# Patient Record
Sex: FEMALE | Race: OTHER | NOT HISPANIC OR LATINO | URBAN - METROPOLITAN AREA
[De-identification: names, ages, dates, MRNs, and addresses within clinical notes are randomized per-mention and may not be internally consistent; named-entity substitution may affect disease eponyms.]

---

## 2022-07-08 ENCOUNTER — OUTPATIENT (OUTPATIENT)
Dept: OUTPATIENT SERVICES | Facility: HOSPITAL | Age: 52
LOS: 1 days | End: 2022-07-08
Payer: COMMERCIAL

## 2022-07-08 VITALS
RESPIRATION RATE: 14 BRPM | WEIGHT: 240.08 LBS | HEART RATE: 78 BPM | SYSTOLIC BLOOD PRESSURE: 132 MMHG | DIASTOLIC BLOOD PRESSURE: 86 MMHG | OXYGEN SATURATION: 98 % | TEMPERATURE: 98 F | HEIGHT: 65 IN

## 2022-07-08 DIAGNOSIS — Z01.818 ENCOUNTER FOR OTHER PREPROCEDURAL EXAMINATION: ICD-10-CM

## 2022-07-08 DIAGNOSIS — Z98.890 OTHER SPECIFIED POSTPROCEDURAL STATES: Chronic | ICD-10-CM

## 2022-07-08 DIAGNOSIS — N84.0 POLYP OF CORPUS UTERI: ICD-10-CM

## 2022-07-08 DIAGNOSIS — Z11.52 ENCOUNTER FOR SCREENING FOR COVID-19: ICD-10-CM

## 2022-07-08 LAB
ANION GAP SERPL CALC-SCNC: 12 MMOL/L — SIGNIFICANT CHANGE UP (ref 5–17)
BLD GP AB SCN SERPL QL: NEGATIVE — SIGNIFICANT CHANGE UP
BUN SERPL-MCNC: 14 MG/DL — SIGNIFICANT CHANGE UP (ref 7–23)
CALCIUM SERPL-MCNC: 9.7 MG/DL — SIGNIFICANT CHANGE UP (ref 8.4–10.5)
CHLORIDE SERPL-SCNC: 100 MMOL/L — SIGNIFICANT CHANGE UP (ref 96–108)
CO2 SERPL-SCNC: 27 MMOL/L — SIGNIFICANT CHANGE UP (ref 22–31)
CREAT SERPL-MCNC: 1 MG/DL — SIGNIFICANT CHANGE UP (ref 0.5–1.3)
EGFR: 68 ML/MIN/1.73M2 — SIGNIFICANT CHANGE UP
GLUCOSE SERPL-MCNC: 80 MG/DL — SIGNIFICANT CHANGE UP (ref 70–99)
HCT VFR BLD CALC: 40.1 % — SIGNIFICANT CHANGE UP (ref 34.5–45)
HGB BLD-MCNC: 13.2 G/DL — SIGNIFICANT CHANGE UP (ref 11.5–15.5)
MCHC RBC-ENTMCNC: 26.9 PG — LOW (ref 27–34)
MCHC RBC-ENTMCNC: 32.9 GM/DL — SIGNIFICANT CHANGE UP (ref 32–36)
MCV RBC AUTO: 81.8 FL — SIGNIFICANT CHANGE UP (ref 80–100)
NRBC # BLD: 0 /100 WBCS — SIGNIFICANT CHANGE UP (ref 0–0)
PLATELET # BLD AUTO: 282 K/UL — SIGNIFICANT CHANGE UP (ref 150–400)
POTASSIUM SERPL-MCNC: 3.4 MMOL/L — LOW (ref 3.5–5.3)
POTASSIUM SERPL-SCNC: 3.4 MMOL/L — LOW (ref 3.5–5.3)
RBC # BLD: 4.9 M/UL — SIGNIFICANT CHANGE UP (ref 3.8–5.2)
RBC # FLD: 13.2 % — SIGNIFICANT CHANGE UP (ref 10.3–14.5)
RH IG SCN BLD-IMP: POSITIVE — SIGNIFICANT CHANGE UP
SODIUM SERPL-SCNC: 139 MMOL/L — SIGNIFICANT CHANGE UP (ref 135–145)
WBC # BLD: 4.52 K/UL — SIGNIFICANT CHANGE UP (ref 3.8–10.5)
WBC # FLD AUTO: 4.52 K/UL — SIGNIFICANT CHANGE UP (ref 3.8–10.5)

## 2022-07-08 PROCEDURE — 86850 RBC ANTIBODY SCREEN: CPT

## 2022-07-08 PROCEDURE — 85027 COMPLETE CBC AUTOMATED: CPT

## 2022-07-08 PROCEDURE — C9803: CPT

## 2022-07-08 PROCEDURE — 86900 BLOOD TYPING SEROLOGIC ABO: CPT

## 2022-07-08 PROCEDURE — 86901 BLOOD TYPING SEROLOGIC RH(D): CPT

## 2022-07-08 PROCEDURE — U0003: CPT

## 2022-07-08 PROCEDURE — 36415 COLL VENOUS BLD VENIPUNCTURE: CPT

## 2022-07-08 PROCEDURE — G0463: CPT

## 2022-07-08 PROCEDURE — U0005: CPT

## 2022-07-08 PROCEDURE — 80048 BASIC METABOLIC PNL TOTAL CA: CPT

## 2022-07-08 RX ORDER — SODIUM CHLORIDE 9 MG/ML
1000 INJECTION, SOLUTION INTRAVENOUS
Refills: 0 | Status: DISCONTINUED | OUTPATIENT
Start: 2022-07-12 | End: 2022-07-26

## 2022-07-08 RX ORDER — SODIUM CHLORIDE 9 MG/ML
3 INJECTION INTRAMUSCULAR; INTRAVENOUS; SUBCUTANEOUS EVERY 8 HOURS
Refills: 0 | Status: DISCONTINUED | OUTPATIENT
Start: 2022-07-12 | End: 2022-07-26

## 2022-07-08 NOTE — H&P PST ADULT - NS PRO REFERRAL CMGT
RN triage ----   Call from pt   Pt states for the past 2 1/2 hrs -- having R upper abd pain -- under ribs --   Sharp pain for a second-- then fades -- and returns Q 35 seconds   No chest pain -- no diff breathing   No nausea or vomiting -- no diarrhea or constipation -- no blood   No fever   Pt thinks maybe gall bladder   Pt states she has had this type of pain in the past -- but not this intense and not lasting this long   Per protocol = should go to ED   Pt requesting PCP advice --   Can she be seen in the clinic ?  Can she go to Urgent care instead of ED or does she need to go to ED   Please advise   Yolis Chaudhry RN [facility] RN triage      Reason for Disposition    Pain lasting > 10 minutes and over 50 years old    Protocols used: ABDOMINAL PAIN - UPPER-A-OH       None

## 2022-07-08 NOTE — H&P PST ADULT - NSICDXPASTSURGICALHX_GEN_ALL_CORE_FT
PAST SURGICAL HISTORY:  H/O plastic surgery face as teenager    History of D&C x's 2 d/t polyps/cysts

## 2022-07-08 NOTE — H&P PST ADULT - NEGATIVE ENMT SYMPTOMS
no hearing difficulty/no ear pain/no tinnitus/no vertigo/no sinus symptoms/no nasal congestion/no nasal discharge/no abnormal taste sensation/no throat pain/no dysphagia

## 2022-07-08 NOTE — H&P PST ADULT - NSANTHOSAYNRD_GEN_A_CORE
No. BLAINE screening performed.  STOP BANG Legend: 0-2 = LOW Risk; 3-4 = INTERMEDIATE Risk; 5-8 = HIGH Risk

## 2022-07-08 NOTE — H&P PST ADULT - PROBLEM SELECTOR PLAN 1
- scheduled for a D&C; operative hysteroscopy; polypectomy on 7/12/22 with Soila Silva  -CBC, BMP, T&S today in [facility]  -COVID swab scheduled for 7/12/22 at [facility]  -Preop instructions provided; Patient stated understanding using teach back method   -Adequate time provided for questions and answers   -Patient advised to stop MVIT today  -Patient advised to take misoprostol and amlodipine am DOS  -POCT urine pregnancy and ABO upon arrival to unit DOS   -LR ordered per protocol

## 2022-07-08 NOTE — H&P PST ADULT - NSICDXPASTMEDICALHX_GEN_ALL_CORE_FT
PAST MEDICAL HISTORY:  Hypertension     Musculoskeletal back pain      PAST MEDICAL HISTORY:  History of goiter (seen by endocrine 3-4 yrs ago/followed by pcp with labs- last 2 months ago/no medications/no difficulty swallowing)    Hypertension     Musculoskeletal back pain

## 2022-07-08 NOTE — H&P PST ADULT - HISTORY OF PRESENT ILLNESS
50 y/o female  50 y/o female with PMH hypertension presents today for presurgical testing.  She has been having intermittent spotting and pelvic cramping.  She is scheduled for a D&C; operative hysteroscopy; polypectomy on 7/12/22 with Soila Silva.  Denies fever, chills, malaise, fatigue, n/v, diarrhea, abdominal pain, sore throat, headaches, nasal congestion, rhinnorhea, ear pain, cough, SOB, chest pain, palpitations, and change in taste/smell.     COVID swab scheduled today (7/8/22) at [facility] 52 y/o female with PMH hypertension, Goiter (seen by endocrine 3-4 yrs ago/followed by pcp with labs- last 2 months ago/no medications/no difficulty swallowing) presents today for presurgical testing.  She has been having intermittent spotting and pelvic cramping.  She is scheduled for a D&C; operative hysteroscopy; polypectomy on 7/12/22 with Soila Silva.  Denies fever, chills, malaise, fatigue, n/v, diarrhea, abdominal pain, sore throat, headaches, nasal congestion, rhinnorhea, ear pain, cough, SOB, chest pain, palpitations, and change in taste/smell.     Chest xray on chart revealed goiter- called patient at 715pm to confirm the above; patient will fax latest thyroid test results and will attempt to get last office note from pcp.  COVID swab scheduled today (7/8/22) at [facility]

## 2022-07-09 LAB — SARS-COV-2 RNA SPEC QL NAA+PROBE: SIGNIFICANT CHANGE UP

## 2022-07-11 ENCOUNTER — TRANSCRIPTION ENCOUNTER (OUTPATIENT)
Age: 52
End: 2022-07-11

## 2022-07-12 ENCOUNTER — TRANSCRIPTION ENCOUNTER (OUTPATIENT)
Age: 52
End: 2022-07-12

## 2022-07-12 ENCOUNTER — RESULT REVIEW (OUTPATIENT)
Age: 52
End: 2022-07-12

## 2022-07-12 ENCOUNTER — OUTPATIENT (OUTPATIENT)
Dept: OUTPATIENT SERVICES | Facility: HOSPITAL | Age: 52
LOS: 1 days | End: 2022-07-12
Payer: COMMERCIAL

## 2022-07-12 VITALS
OXYGEN SATURATION: 98 % | TEMPERATURE: 97 F | SYSTOLIC BLOOD PRESSURE: 139 MMHG | HEART RATE: 96 BPM | WEIGHT: 240.08 LBS | RESPIRATION RATE: 16 BRPM | HEIGHT: 65 IN | DIASTOLIC BLOOD PRESSURE: 85 MMHG

## 2022-07-12 VITALS
HEART RATE: 87 BPM | SYSTOLIC BLOOD PRESSURE: 135 MMHG | OXYGEN SATURATION: 98 % | DIASTOLIC BLOOD PRESSURE: 71 MMHG | RESPIRATION RATE: 18 BRPM

## 2022-07-12 DIAGNOSIS — N84.0 POLYP OF CORPUS UTERI: ICD-10-CM

## 2022-07-12 DIAGNOSIS — Z98.890 OTHER SPECIFIED POSTPROCEDURAL STATES: Chronic | ICD-10-CM

## 2022-07-12 DIAGNOSIS — Z01.818 ENCOUNTER FOR OTHER PREPROCEDURAL EXAMINATION: ICD-10-CM

## 2022-07-12 LAB — RH IG SCN BLD-IMP: POSITIVE — SIGNIFICANT CHANGE UP

## 2022-07-12 PROCEDURE — 88305 TISSUE EXAM BY PATHOLOGIST: CPT

## 2022-07-12 PROCEDURE — 88305 TISSUE EXAM BY PATHOLOGIST: CPT | Mod: 26

## 2022-07-12 PROCEDURE — 58558 HYSTEROSCOPY BIOPSY: CPT

## 2022-07-12 RX ORDER — AMLODIPINE BESYLATE 2.5 MG/1
1 TABLET ORAL
Qty: 0 | Refills: 0 | DISCHARGE

## 2022-07-12 RX ORDER — MEDROXYPROGESTERONE ACETATE 150 MG/ML
1 INJECTION, SUSPENSION, EXTENDED RELEASE INTRAMUSCULAR
Qty: 0 | Refills: 0 | DISCHARGE

## 2022-07-12 RX ORDER — LIDOCAINE HCL 20 MG/ML
0.2 VIAL (ML) INJECTION ONCE
Refills: 0 | Status: COMPLETED | OUTPATIENT
Start: 2022-07-12 | End: 2022-07-12

## 2022-07-12 RX ORDER — MISOPROSTOL 200 UG/1
2 TABLET ORAL
Qty: 0 | Refills: 0 | DISCHARGE

## 2022-07-12 RX ADMIN — SODIUM CHLORIDE 100 MILLILITER(S): 9 INJECTION, SOLUTION INTRAVENOUS at 11:00

## 2022-07-12 RX ADMIN — SODIUM CHLORIDE 3 MILLILITER(S): 9 INJECTION INTRAMUSCULAR; INTRAVENOUS; SUBCUTANEOUS at 11:06

## 2022-07-12 NOTE — BRIEF OPERATIVE NOTE - OPERATION/FINDINGS
Exam under anesthesia revealed a normal sized retroverted uterus. The anterior lip of the cervix was grasped with a tenaculum. Cervix was gently dilated to 9mm. Operative hysteroscope was introduced with Glycine. Resectoscope was introduced and endometrial polyps were visualized. 3cm Polyp was resected and curettage was used to scrape the cavity for curettings which were placed on a telfa pad. Endometrial cavity was vascualr but smooth. Bilateral ostia were visualized. Patient tolerated procedure well.

## 2022-07-12 NOTE — ASU DISCHARGE PLAN (ADULT/PEDIATRIC) - NURSING INSTRUCTIONS
Next dose of ibuprofen can be taken at/after ____ 8:30pm____. First dose of ibuprofen was given at 2:26pm

## 2022-07-12 NOTE — PRE-ANESTHESIA EVALUATION ADULT - NSANTHPMHFT_GEN_ALL_CORE
50 y/o female with PMH hypertension, Goiter (seen by endocrine 3-4 yrs ago/followed by pcp with labs- last 2 months ago/no medications/no difficulty swallowing) presents today for presurgical testing.  She has been having intermittent spotting and pelvic cramping.  She is scheduled for a D&C; operative hysteroscopy; polypectomy on 7/12/22 with Soila Silva.  Denies fever, chills, malaise, fatigue, n/v, diarrhea, abdominal pain, sore throat, headaches, nasal congestion, rhinnorhea, ear pain, cough, SOB, chest pain, palpitations, and change in taste/smell.     Pt has a stable goiter X15 denies Resp Sx

## 2022-07-12 NOTE — ASU DISCHARGE PLAN (ADULT/PEDIATRIC) - CARE PROVIDER_API CALL
Soila Godfrey  OBSTETRICS AND GYNECOLOGY  [address], [address], [place] [address]  Phone: [phone]  Fax: [phone]  Established Patient  Follow Up Time: 1 month

## 2022-07-12 NOTE — ASU PATIENT PROFILE, ADULT - FALL HARM RISK - UNIVERSAL INTERVENTIONS
Bed in lowest position, wheels locked, appropriate side rails in place/Call bell, personal items and telephone in reach/Instruct patient to call for assistance before getting out of bed or chair/Non-slip footwear when patient is out of bed/[place] to call system/Physically safe environment - no spills, clutter or unnecessary equipment/Purposeful Proactive Rounding/Room/bathroom lighting operational, light cord in reach

## 2022-07-12 NOTE — BRIEF OPERATIVE NOTE - NSICDXBRIEFPREOP_GEN_ALL_CORE_FT
PRE-OP DIAGNOSIS:  Postmenopause bleeding 12-Jul-2022 14:51:46  Shantel Saenz  Endometrial polyp 12-Jul-2022 14:51:55  Shantel Saenz

## 2022-07-12 NOTE — ASU PATIENT PROFILE, ADULT - NSICDXPASTMEDICALHX_GEN_ALL_CORE_FT
PAST MEDICAL HISTORY:  History of goiter (seen by endocrine 3-4 yrs ago/followed by pcp with labs- last 2 months ago/no medications/no difficulty swallowing)    Hypertension     Musculoskeletal back pain

## 2022-07-12 NOTE — ASU PREOP CHECKLIST - HEIGHT IN CM
"SUBJECTIVE:  Sofi Otoole is an 55 year old female who presents for evaluation of   hyperlipidemia. She has been diagnosed in the past as having   combined hyperlipidemia. She indicates that she is feeling well   and denies any symptoms of cardiovascular disease. Specifically   denies chest pain, palpitations, dyspnea, orthopnea, PND,   claudication or peripheral edema. Treatment modalities employed to   this point include diet, regular aerobic exercise and Lipitor. Current medication   regimen is as listed below. Patient denies any side effects of   medication.    Family history: positive for hypertension, cardiovascular disease and elevated cholesterol  Age at diagnosis of hyperlipidemia: 39  Cardiovascular risk factors: previous smoker, family history, lipids, obesity and stress    Current Outpatient Prescriptions   Medication     atorvastatin (LIPITOR) 20 MG tablet     cetirizine (ZYRTEC) 10 MG tablet     doxepin (SINEQUAN) 10 MG capsule     ASPIRIN NOT PRESCRIBED, INTENTIONAL,     No current facility-administered medications for this visit.      Allergies   Allergen Reactions     Morphine Nausea and Vomiting       Social History   Substance Use Topics     Smoking status: Former Smoker     Packs/day: 0.75     Years: 30.00     Types: Cigarettes     Quit date: 8/20/2016     Smokeless tobacco: Never Used     Alcohol use 0.0 oz/week     0 Standard drinks or equivalent per week       OBJECTIVE:  /70 (BP Location: Left arm, Patient Position: Chair, Cuff Size: Adult Large)  Pulse 76  Temp 98.5  F (36.9  C) (Oral)  Ht 1.676 m (5' 6\")  Wt 79.7 kg (175 lb 9.6 oz)  LMP  (LMP Unknown)  SpO2 98%  Breastfeeding? No  BMI 28.34 kg/m2  Repeat BP R arm seated = 124/70  L arm seated = 124/70 with regular size cuff.  Skin: negative  Fundi: deferred  Lungs: negative, Percussion normal. Good diaphragmatic excursion. Lungs clear  Heart: negative, PMI normal. No lifts, heaves, or thrills. RRR. No murmurs, clicks gallops " 165.1 or rub  Peripheral pulses: radial=4/4, femoral=4/4, popliteal=4/4, dorsalis pedis=4/4,  Abd; The abdomen is soft without tenderness, guarding, mass or organomegaly. Bowel sounds are normal. No CVA tenderness or inguinal adenopathy noted.  BACK:Lumbosacral spine area reveals no local tenderness or mass.  Painful and reduced LS ROM noted with right midback muscle spasms. Straight leg raise is negative at 90 degrees.  DTR's, motor strength and sensation normal, including heel and toe gait.  Peripheral pulses are palpable.    BMI : Body mass index is 28.34 kg/(m^2).  UA; mild changes held for culture    ASSESSMENT:(E78.2) Mixed hyperlipidemia  (primary encounter diagnosis)  Plan: VENIPUNC FNGR,HEEL,EAR [[id number]], COMPREHENSIVE         METABOLIC PANEL, LIPID PANEL, atorvastatin         (LIPITOR) 20 MG tablet        1)  Medication: continue current medication regimen unchanged  2)  Low fat, low cholesterol diet  3)  Regular aerobic exercise  4)  Recheck in 6 months, sooner should new symptoms or   problems arise.    Patient Education: Reviewed risks of elevated lipids and principles   of treatment.        (M54.5,  G89.29) Chronic right-sided low back pain without sciatica  Plan: HCL  Urinalysis, Routine (BFP), VENIPUNC         FNGR,HEEL,EAR [[id number]], AUTO HEMOGRAM/PLATE/DIFF        [08154.000]        Await labs. Rehab stretches/ exercises to begin immediately and given in writing with illustrations.  See note for flex dollar use for massage therapy    (R10.9) Flank pain  Plan: Urine Culture Aerobic Bacterial        Symptomatic care with decongestants, fluids, tylenol/advil prn. Use GUAIFENESIN  MG OR TBCR, 1 tab po BID (Twice per day), D: 20, R: 0 for congestion and cough.    In addition, I have suggested that the patient   monitor for symptoms of bacterial infection expecting slow gradual resolution of viral URI as the natural course.      (T78.3XXD) Angioedema, subsequent encounter  Plan: cetirizine (ZYRTEC) 10 MG  tablet, doxepin         (SINEQUAN) 10 MG capsule        I have reviewed the patient's medical history in detail and updated the computerized patient record.      (Z13.1) Screening for diabetes mellitus  Plan: VENIPUNC FNGR,HEEL,EAR [[id number]], COMPREHENSIVE         METABOLIC PANEL            (Z76.0) Encounter for medication refill  Plan: VENIPUNC FNGR,HEEL,EAR [[id number]], COMPREHENSIVE         METABOLIC PANEL, LIPID PANEL            (Z23) Need for vaccination  Plan: VACCINE ADMINISTRATION, INITIAL, TDAP VACCINE         (BOOSTRIX)

## 2022-07-12 NOTE — ASU DISCHARGE PLAN (ADULT/PEDIATRIC) - NS MD DC FALL RISK RISK
For information on Fall & Injury Prevention, visit: [email or web address] OR  [email or web address] OR  https://www.cdc.gov/steadi/patient.html

## 2022-07-12 NOTE — BRIEF OPERATIVE NOTE - NSICDXBRIEFPROCEDURE_GEN_ALL_CORE_FT
PROCEDURES:  Hysteroscopy with dilation and curettage of uterus and use of resectoscope 12-Jul-2022 14:52:50  Shantel Saenz

## 2022-07-12 NOTE — ASU DISCHARGE PLAN (ADULT/PEDIATRIC) - ***IN THE EVENT THAT YOU DEVELOP A COMPLICATION AND YOU ARE UNABLE TO REACH YOUR OWN PHYSICIAN, YOU MAY CONTACT:
Pt discharged in stable condition. She states understanding to discharge teaching and has no further questions or concerns at this time.    Statement Selected

## 2022-07-12 NOTE — ASU PATIENT PROFILE, ADULT - VISION (WITH CORRECTIVE LENSES IF THE PATIENT USUALLY WEARS THEM):
Normal vision: sees adequately in most situations; can see medication labels, newsprint
[phone]

## 2022-07-18 LAB — SURGICAL PATHOLOGY STUDY: SIGNIFICANT CHANGE UP

## 2023-02-09 PROBLEM — Z86.39 PERSONAL HISTORY OF OTHER ENDOCRINE, NUTRITIONAL AND METABOLIC DISEASE: Chronic | Status: ACTIVE | Noted: 2022-07-08

## 2023-02-09 PROBLEM — M54.9 DORSALGIA, UNSPECIFIED: Chronic | Status: ACTIVE | Noted: 2022-07-08

## 2023-02-09 PROBLEM — I10 ESSENTIAL (PRIMARY) HYPERTENSION: Chronic | Status: ACTIVE | Noted: 2022-07-08

## 2023-03-05 ENCOUNTER — OUTPATIENT (OUTPATIENT)
Dept: OUTPATIENT SERVICES | Facility: HOSPITAL | Age: 53
LOS: 1 days | Discharge: HOME | End: 2023-03-05

## 2023-03-05 DIAGNOSIS — Z98.890 OTHER SPECIFIED POSTPROCEDURAL STATES: Chronic | ICD-10-CM

## 2023-03-05 DIAGNOSIS — Z31.5 ENCOUNTER FOR PROCREATIVE GENETIC COUNSELING: ICD-10-CM

## 2023-03-08 ENCOUNTER — LABORATORY RESULT (OUTPATIENT)
Age: 53
End: 2023-03-08

## 2023-03-08 ENCOUNTER — APPOINTMENT (OUTPATIENT)
Dept: HEMATOLOGY ONCOLOGY | Facility: CLINIC | Age: 53
End: 2023-03-08

## 2023-03-08 NOTE — DISCUSSION/SUMMARY
[FreeTextEntry1] : REASON FOR CONSULT\par Isabella Spears is a 52-year-old female who was referred by Dr. Soila Godfrey for cancer genetic counseling and risk assessment due to a family history of uterine and other unknown cancers. \par \par RELEVANT MEDICAL HISTORY\par Ms. Spears is a healthy individual who has never had cancer. She has a family history of cancer, see below.\par \par OTHER MEDICAL AND SURGICAL HISTORY:\par Left breast biopsy – reported cyst (3 years ago). HTN. HLD. Endometrial polyp and proliferative endometrium (2022 curetting). Hernia. Diverticulitis. Thyroid nodules (partial thyroidectomy pending). Uterine cyst (). Cyst on vagina removed. \par \par PAST OB/GYN HISTORY:\par Obstetrical History: \par Age at Menarche: 12\par Perimenopausal \par Age at First Live Birth: 15\par Contraceptive Use: Yes, oral for 1-2 years and IUD/Loop for many years. \par Hormone Replacement Therapy: No\par \par CANCER SCREENING HISTORY:  \par Breast: Last mammogram and sonogram within the last 6 months, reported dense breasts, otherwise normal. Frequency: yearly. \par GYN: Last visit , follow-up regarding proliferative endometrium, no other issues reported. Frequency: usually yearly, recently more frequently given gyn issues. \par Colon: Last colonoscopy , no polyps reported. Frequency: every 10 years. \par Skin: Last exam 6 months ago, noncancerous lesions reportedly removed. Patient reported multiple cysts on the body. Frequency: starting yearly. \par \par SOCIAL HISTORY:\par •	\par •	Tobacco-product use: No\par \par FAMILY HISTORY:\par Maternal ancestry was reported as English, , and , and paternal ancestry was reported as English, Taiwanese,  and Tog[place]ese. No Ashkenazi Adventism heritage reported. A detailed family history of cancer was ascertained, see below and scanned chart for pedigree. \par \par To Ms. Spears’s knowledge, no one in the family has had germline testing for cancer susceptibility. However, she will find out if her paternal half-sister has pursued genetic testing in [place]. \par 	\par 	RISK ASSESSMENT:\par Ms. Spears’s family history of early-onset uterine cancer is suggestive of more than one hereditary cancer predisposition syndrome. Given Ms. Spears current gynecologic issues, she is wanting to pursue a risk-reducing hysterectomy however genetic testing was requested by her OB/GYN in order to make a decision. We recommended genetic testing for a guidelines-based breast/gyn and colorectal cancer panel. This test analyzes 29 genes: APC, ELIOT, AXIN2, BARD1, BMPR1A, BRCA1, BRCA2, BRIP1, CDH1, CHEK2, EPCAM, GREM1, MLH1, MSH2, MSH3, MSH6, MUTYH, NF1, NTHL1, PALB2, PMS2, POLD1, POLE, PTEN, RAD51C, RAD51D, SMAD4, STK11, TP53.\par \par We discussed the risks, benefits and limitations, and implications of genetic testing. We also discussed the psychosocial implications of genetic testing. Possible test results were reviewed with Ms. Spears, along with associated medical management options. The Genetic Information Non-discrimination Act (MANJINDER) was also reviewed.\par \par Ms. Spears consented to the above-mentioned genetic testing panel. Blood was drawn in our laboratory and sent to [facility] today.\par \par PLAN:\par \par 1.	Blood drawn today will be sent to [facility] for analysis. \par 2.	We will contact Ms. Spears once the results are available and will schedule a follow-up appointment, as needed. Results generally return in 2-3 weeks from the day the sample kit is mailed.\par \par For any additional questions please call [facility] at [phone]. \par \par \par Shashank Devine, MS, [facility]\par Genetic Counselor, [facility]\par \par \par CC: \par Dr. Soila Godfrey

## 2023-03-22 ENCOUNTER — NON-APPOINTMENT (OUTPATIENT)
Age: 53
End: 2023-03-22

## 2023-05-18 NOTE — DISCUSSION/SUMMARY
[FreeTextEntry1] : RESULTS TRANSMISSION\par Isabella Spears is a 52-year-old female who was called 03/22/2023 for a discussion regarding their genetic testing results related to hereditary cancer predisposition. \par \par Ms. Spears was originally seen at [facility] on 03/08/2023 for hereditary cancer predisposition risk assessment. She has no personal history of cancer but she has a family history of early-onset uterine cancer. Ms. Spears decided to pursue genetic testing using a guidelines-based breast/gyn and colorectal cancer panel offered by [facility].\par \par TEST RESULTS: NEGATIVE\par \par No pathogenic (disease-causing) variants or VUSs were detected in the following genes: APC*, ELIOT*, AXIN2, BARD1, BMPR1A, BRCA1, BRCA2, BRIP1, CDH1, CHEK2, EPCAM*, GREM1*, MLH1*, MSH2*, MSH3*, MSH6*, MUTYH, NF1*, NTHL1, PALB2, PMS2*, POLD1*, POLE, PTEN*, RAD51C, RAD51D, SMAD4, STK11, TP53\par \par RESULTS INTERPRETATION AND ASSESSMENT:\par Given Ms. Spears’s personal and current reported family history of cancer, her negative genetic test results, and in the absence of other indications, she should practice age-appropriate cancer screening of other organ systems as recommended for the general population.\par \par We also discussed that, while no cause of the patient’s family history of cancer was identified, this result, while reassuring, does entirely not rule out a hereditary cancer risk in the patient. It is possible, although unlikely, the patient has a mutation in one of the genes tested that is not detectable by this analysis, or has a mutation in a different gene, either known or unknown. It is also possible there is a hereditary cancer predisposition in the family, but the patient did not inherit it.\par \par We informed Ms. Spears that our knowledge of genetics and inherited cancer conditions is changing rapidly. Therefore, we recommended that Ms. Spears contact our office, every 2 to 3 years, to discuss relevant advances in cancer genetics.  We emphasized the importance of re-contacting us with updates regarding her personal and family history of cancer as well as any updates regarding additional cancer genetic test results performed for the patient and/or family members.  Such updates could possibly change our risk assessment and recommendations. \par \par PLAN:\par 1.	See above for recommended screening and risk-reduction strategies.\par 2.	Patient informed consult note(s) will be available through their [facility] patient portal and genetic test results will be released via [facility]’s laboratory portal. \par 3.	Ms. Spears was encouraged to contact us every 2-3 years to discuss relevant advances in cancer genetics, or sooner if there are any changes in her personal or family history of cancer.\par \par \par For any additional questions please call [facility] at [phone]. \par \par \par Shashank Devine, MS, [facility]\par Genetic Counselor, [facility]\par \par \par CC: \par Patient\par Dr. Soila Godfrey

## 2024-01-16 ENCOUNTER — APPOINTMENT (OUTPATIENT)
Dept: ORTHOPEDIC SURGERY | Facility: CLINIC | Age: 54
End: 2024-01-16
Payer: COMMERCIAL

## 2024-01-16 VITALS — WEIGHT: 250 LBS | HEIGHT: 65 IN | BODY MASS INDEX: 41.65 KG/M2

## 2024-01-16 DIAGNOSIS — M62.830 MUSCLE SPASM OF BACK: ICD-10-CM

## 2024-01-16 DIAGNOSIS — D17.1 BENIGN LIPOMATOUS NEOPLASM OF SKIN AND SUBCUTANEOUS TISSUE OF TRUNK: ICD-10-CM

## 2024-01-16 DIAGNOSIS — M54.16 RADICULOPATHY, LUMBAR REGION: ICD-10-CM

## 2024-01-16 DIAGNOSIS — M70.61 TROCHANTERIC BURSITIS, RIGHT HIP: ICD-10-CM

## 2024-01-16 PROCEDURE — 72100 X-RAY EXAM L-S SPINE 2/3 VWS: CPT

## 2024-01-16 PROCEDURE — 72070 X-RAY EXAM THORAC SPINE 2VWS: CPT

## 2024-01-16 PROCEDURE — 99203 OFFICE O/P NEW LOW 30 MIN: CPT | Mod: 25

## 2024-01-16 RX ORDER — ROSUVASTATIN CALCIUM 5 MG/1
TABLET, FILM COATED ORAL
Refills: 0 | Status: ACTIVE | COMMUNITY

## 2024-01-16 RX ORDER — LOSARTAN POTASSIUM 25 MG/1
25 TABLET, FILM COATED ORAL
Refills: 0 | Status: ACTIVE | COMMUNITY

## 2024-01-16 RX ORDER — AMLODIPINE BESYLATE 5 MG/1
5 TABLET ORAL
Refills: 0 | Status: ACTIVE | COMMUNITY

## 2024-01-16 RX ORDER — GABAPENTIN 100 MG/1
100 CAPSULE ORAL
Qty: 60 | Refills: 2 | Status: ACTIVE | COMMUNITY
Start: 2024-01-16 | End: 1900-01-01

## 2024-01-16 NOTE — HISTORY OF PRESENT ILLNESS
[Lower back] : lower back [10] : 10 [7] : 7 [Dull/Aching] : dull/aching [Constant] : constant [Nothing helps with pain getting better] : Nothing helps with pain getting better [Standing] : standing [Walking] : walking [Bending forward] : bending forward [Extending back] : extending back [Stairs] : stairs [Lying in bed] : lying in bed [de-identified] : 1/16/24-Pt is a 53 year old female presenting with lower back pain beginning 2 years ago w/o injury. Associated radiation to R foot. Aggravated by prolonged standing/walking/bending/lying down. Denies prior lower back PT. Denies prior lower back surgeries. No BB dysfunction.  [] : no [FreeTextEntry7] : right foot

## 2024-01-16 NOTE — ASSESSMENT
[FreeTextEntry1] : GT bursitis on R plus likelyBLE radiculopathy MRI thoracic spine to eval soft tissue mass  Gabapentin- Patient advised of sedating effects, instructed not to drive, operate machinery, or take with other sedating medications. Advised of need to taper on/off medication and risk of abruptly stopping gabapentin.

## 2024-01-16 NOTE — IMAGING
[Disc space narrowing] : Disc space narrowing [de-identified] : T/LSPINE Inspection: No rash or ecchymosis Palpation: No tenderness to palpation or spasm in bilateral thoracic and lumbar paraspinal musculature, no SI joint tenderness to palpation. 2x2cm soft, mobile, rubbery mass L thoracic region just lateral to midline at level near inferior scapula ROM: Full with stiffness Strength: 5/5 bilateral hip flexors, knee extensors, ankle dorsiflexors, EHL, ankle plantarflexors Sensation: Sensation present to light touch bilateral L2-S1 distributions Provocative maneuvers: + bilateral straight leg raise  R Hip- Palpation: Tenderness to palpation over greater trochanter and IT band ROM: No groin pain with flexion and internal rotation

## 2024-01-22 ENCOUNTER — APPOINTMENT (OUTPATIENT)
Dept: MRI IMAGING | Facility: CLINIC | Age: 54
End: 2024-01-22

## 2024-01-29 ENCOUNTER — APPOINTMENT (OUTPATIENT)
Dept: MRI IMAGING | Facility: CLINIC | Age: 54
End: 2024-01-29
Payer: COMMERCIAL

## 2024-01-29 ENCOUNTER — APPOINTMENT (OUTPATIENT)
Dept: MRI IMAGING | Facility: CLINIC | Age: 54
End: 2024-01-29

## 2024-01-29 PROCEDURE — 72146 MRI CHEST SPINE W/O DYE: CPT

## 2024-02-02 ENCOUNTER — APPOINTMENT (OUTPATIENT)
Dept: ORTHOPEDIC SURGERY | Facility: CLINIC | Age: 54
End: 2024-02-02

## 2024-02-05 ENCOUNTER — TRANSCRIPTION ENCOUNTER (OUTPATIENT)
Age: 54
End: 2024-02-05

## 2024-02-16 ENCOUNTER — APPOINTMENT (OUTPATIENT)
Dept: ORTHOPEDIC SURGERY | Facility: CLINIC | Age: 54
End: 2024-02-16
Payer: COMMERCIAL

## 2024-02-16 DIAGNOSIS — M62.830 MUSCLE SPASM OF BACK: ICD-10-CM

## 2024-02-16 DIAGNOSIS — M51.36 OTHER INTERVERTEBRAL DISC DEGENERATION, LUMBAR REGION: ICD-10-CM

## 2024-02-16 DIAGNOSIS — M51.9 UNSPECIFIED THORACIC, THORACOLUMBAR AND LUMBOSACRAL INTERVERTEBRAL DISC DISORDER: ICD-10-CM

## 2024-02-16 PROCEDURE — 99213 OFFICE O/P EST LOW 20 MIN: CPT

## 2024-02-16 NOTE — ASSESSMENT
[FreeTextEntry1] : GT bursitis on R plus likely BLE radiculopathy Rx provided for TLSO to immobilize t/l spine due to chronic inflammation seen on MRI c/w PT  f/up 2 months   Gabapentin- Patient advised of sedating effects, instructed not to drive, operate machinery, or take with other sedating medications. Advised of need to taper on/off medication and risk of abruptly stopping gabapentin.     Entered by Sirena Mccloud acting as scribe. Dr. Raphael- The documentation recorded by the scribe accurately reflects the service I personally performed and the decisions made by me.

## 2024-02-16 NOTE — IMAGING
[Disc space narrowing] : Disc space narrowing [de-identified] : T/LSPINE Inspection: No rash or ecchymosis Palpation: No tenderness to palpation or spasm in bilateral thoracic and lumbar paraspinal musculature, no SI joint tenderness to palpation. 2x2cm soft, mobile, rubbery mass L thoracic region just lateral to midline at level near inferior scapula ROM: Full with stiffness Strength: 5/5 bilateral hip flexors, knee extensors, ankle dorsiflexors, EHL, ankle plantarflexors Sensation: Sensation present to light touch bilateral L2-S1 distributions Provocative maneuvers: + bilateral straight leg raise  R Hip- Palpation: Tenderness to palpation over greater trochanter and IT band ROM: No groin pain with flexion and internal rotation

## 2024-02-16 NOTE — HISTORY OF PRESENT ILLNESS
[Lower back] : lower back [10] : 10 [7] : 7 [Dull/Aching] : dull/aching [Constant] : constant [Nothing helps with pain getting better] : Nothing helps with pain getting better [Standing] : standing [Walking] : walking [Bending forward] : bending forward [Extending back] : extending back [Lying in bed] : lying in bed [Stairs] : stairs [de-identified] : 1/29/24 Thoracic MRI  - report noted in chart.   Ind. review- agree STIR signal noted ================= 1/16/24-Pt is a 53 year old female presenting with lower back pain beginning 2 years ago w/o injury. Associated radiation to R foot. Aggravated by prolonged standing/walking/bending/lying down. Denies prior lower back PT. Denies prior lower back surgeries. No BB dysfunction.  2/16/24-  MRI f/up. pain has improved on the left side of that back but worsened on the right side after starting PT. Denies any recent injury or trauma. Does state hx of a fall onto stairs approx 2 years ago.  [] : no [FreeTextEntry7] : right foot

## (undated) DEVICE — ELCTR HF RESECTION LOOP ANGLED 22.5FR

## (undated) DEVICE — SOL IRR POUR NS 0.9% 500ML

## (undated) DEVICE — SOL IRR GLYCINE 1.5% 3000L

## (undated) DEVICE — POSITIONER SAGE MOBILITY TRANSFER PAD

## (undated) DEVICE — POSITIONER PATIENT SAFETY STRAP 3X60"

## (undated) DEVICE — DRAPE LIGHT HANDLE COVER (GREEN)

## (undated) DEVICE — SOL IRR BAG NS 0.9% 3000ML

## (undated) DEVICE — DRAPE 1/2 SHEET 40X57"

## (undated) DEVICE — PACK LITHOTOMY

## (undated) DEVICE — WARMING BLANKET UPPER ADULT

## (undated) DEVICE — GLV 7 PROTEXIS (WHITE)

## (undated) DEVICE — TUBING STRYKER HYSTEROSCOPY INFLOW OUTFLOW